# Patient Record
Sex: FEMALE | Race: OTHER | URBAN - METROPOLITAN AREA
[De-identification: names, ages, dates, MRNs, and addresses within clinical notes are randomized per-mention and may not be internally consistent; named-entity substitution may affect disease eponyms.]

---

## 2024-08-31 ENCOUNTER — HOSPITAL ENCOUNTER (EMERGENCY)
Facility: HOSPITAL | Age: 44
Discharge: HOME | End: 2024-08-31
Attending: EMERGENCY MEDICINE
Payer: MEDICAID

## 2024-08-31 VITALS
OXYGEN SATURATION: 98 % | HEART RATE: 78 BPM | RESPIRATION RATE: 16 BRPM | SYSTOLIC BLOOD PRESSURE: 126 MMHG | DIASTOLIC BLOOD PRESSURE: 58 MMHG | TEMPERATURE: 98 F

## 2024-08-31 DIAGNOSIS — R42 DIZZINESS: ICD-10-CM

## 2024-08-31 DIAGNOSIS — R11.0 NAUSEA: ICD-10-CM

## 2024-08-31 DIAGNOSIS — F12.90 USE OF CANNABINOID EDIBLES: Primary | ICD-10-CM

## 2024-08-31 LAB — GLUCOSE SERPL-MCNC: 121 MG/DL (ref 65–140)

## 2024-08-31 PROCEDURE — 99284 EMERGENCY DEPT VISIT MOD MDM: CPT

## 2024-08-31 PROCEDURE — 99284 EMERGENCY DEPT VISIT MOD MDM: CPT | Performed by: EMERGENCY MEDICINE

## 2024-08-31 PROCEDURE — 93005 ELECTROCARDIOGRAM TRACING: CPT

## 2024-08-31 PROCEDURE — 82948 REAGENT STRIP/BLOOD GLUCOSE: CPT

## 2024-08-31 RX ORDER — ONDANSETRON 4 MG/1
4 TABLET, ORALLY DISINTEGRATING ORAL ONCE
Status: COMPLETED | OUTPATIENT
Start: 2024-08-31 | End: 2024-08-31

## 2024-08-31 RX ORDER — ONDANSETRON 4 MG/1
4 TABLET, ORALLY DISINTEGRATING ORAL EVERY 6 HOURS PRN
Qty: 20 TABLET | Refills: 0 | Status: SHIPPED | OUTPATIENT
Start: 2024-08-31

## 2024-08-31 RX ADMIN — ONDANSETRON 4 MG: 4 TABLET, ORALLY DISINTEGRATING ORAL at 22:10

## 2024-09-01 LAB
ATRIAL RATE: 101 BPM
P AXIS: 71 DEGREES
PR INTERVAL: 118 MS
QRS AXIS: 74 DEGREES
QRSD INTERVAL: 78 MS
QT INTERVAL: 356 MS
QTC INTERVAL: 461 MS
T WAVE AXIS: 40 DEGREES
VENTRICULAR RATE: 101 BPM

## 2024-09-01 PROCEDURE — 93010 ELECTROCARDIOGRAM REPORT: CPT | Performed by: STUDENT IN AN ORGANIZED HEALTH CARE EDUCATION/TRAINING PROGRAM

## 2024-09-01 NOTE — DISCHARGE INSTRUCTIONS
Avoid use of Gummies or other edibles.  Drink plenty of fluids, and eat as you are able to tolerate.  Follow-up with your primary care doctor to make sure you are doing better.  Return if you develop worsening or changing symptoms, persistent vomiting and are unable to tolerate fluids despite the nausea medication, or for any other concerns.

## 2024-09-01 NOTE — ED PROVIDER NOTES
History  Chief Complaint   Patient presents with    Dizziness     Per ems, pt took an edible at 6 pm and 7pm.  Pt called ems for dizziness.  On arrival, translation line was used.  PT refused to answer questions, with the exception of her birthdate.  Pt refused to change into gown.  Unable to obtain any information.       Dizziness      None       No past medical history on file.    No past surgical history on file.    No family history on file.  I have reviewed and agree with the history as documented.    No existing history information found.  No existing history information found.       Review of Systems   Neurological:  Positive for dizziness.       Physical Exam  Physical Exam  Vitals and nursing note reviewed.   Constitutional:       General: She is not in acute distress.     Appearance: She is well-developed.   HENT:      Head: Normocephalic and atraumatic.   Eyes:      Extraocular Movements: Extraocular movements intact.      Conjunctiva/sclera: Conjunctivae normal.      Pupils: Pupils are equal, round, and reactive to light.      Comments: Pupils enlarged, but responsive to light   Neck:      Trachea: No tracheal deviation.   Cardiovascular:      Rate and Rhythm: Normal rate and regular rhythm.      Heart sounds: Normal heart sounds.   Pulmonary:      Effort: Pulmonary effort is normal. No respiratory distress.      Breath sounds: Normal breath sounds.   Abdominal:      General: There is no distension.      Palpations: Abdomen is soft.      Tenderness: There is no abdominal tenderness.   Musculoskeletal:      Cervical back: Normal range of motion.   Skin:     General: Skin is warm and dry.   Neurological:      Mental Status: She is alert and oriented to person, place, and time.      GCS: GCS eye subscore is 4. GCS verbal subscore is 5. GCS motor subscore is 6.      Cranial Nerves: No dysarthria or facial asymmetry.      Sensory: Sensation is intact.      Motor: Motor function is intact. No weakness.    Psychiatric:         Mood and Affect: Mood and affect normal.         Behavior: Behavior normal.         Vital Signs  ED Triage Vitals [08/31/24 2035]   Temperature Pulse Respirations Blood Pressure SpO2   98 °F (36.7 °C) 78 16 126/58 98 %      Temp Source Heart Rate Source Patient Position - Orthostatic VS BP Location FiO2 (%)   Oral Monitor Lying Right arm --      Pain Score       --           Vitals:    08/31/24 2035   BP: 126/58   Pulse: 78   Patient Position - Orthostatic VS: Lying         Visual Acuity      ED Medications  Medications   ondansetron (ZOFRAN-ODT) dispersible tablet 4 mg (4 mg Oral Given 8/31/24 2210)       Diagnostic Studies  Results Reviewed       Procedure Component Value Units Date/Time    Fingerstick Glucose (POCT) [[id number]]  (Normal) Collected: 08/31/24 2216    Lab Status: Final result Specimen: Blood Updated: 08/31/24 2217     POC Glucose 121 mg/dl                    No orders to display              Procedures  ECG 12 Lead Documentation Only    Date/Time: 8/31/2024 10:14 PM    Performed by: Hafsa Loza MD  Authorized by: Hafsa Loza MD    Indications / Diagnosis:  Dizziness, palpitations after gummy  ECG reviewed by me, the ED Provider: yes    Patient location:  ED  Previous ECG:     Previous ECG:  Unavailable  Interpretation:     Interpretation: non-specific    Rate:     ECG rate:  101    ECG rate assessment: tachycardic    Rhythm:     Rhythm: sinus tachycardia    Ectopy:     Ectopy: none    QRS:     QRS axis:  Normal    QRS intervals:  Normal  Conduction:     Conduction: normal    ST segments:     ST segments:  Normal  T waves:     T waves: normal             ED Course                                               Medical Decision Making  This is a 43-year-old female who presents here today with dizziness.  She says symptoms began at around 1900 while eating dinner, shortly after eating Gummies.  She feels like things are spinning, and that she  is in a fog.  She denies any weakness or numbness.  She has nausea but no vomiting.  She denies vision changes, recent falls or head injuries.  She has occasional palpitations but denies chest pain.  She denies underlying medical problems.  Last menstrual period was last week, and was normal and regular for her.  She has never had edibles or marijuana previously.  She did have a sip of rum but denies any other alcohol use.  She denies any other drug use.  She did exam club soda which helped slightly.  Symptoms have improved since onset but are still present.    ROS: Otherwise negative, unless stated as above.    She is well-appearing, in no acute distress.  Exam is unremarkable.  Symptoms are likely due to edible use.  With palpitations we will check EKG to ensure there are no dysrhythmias.  We will treat nausea, and ensure she is able to tolerate oral intake prior to discharge.  I am not concerned about potential neurologic process, electrolyte abnormality contributing to symptoms, I do not feel that she needs any further workup or evaluation at this point in time.    EKG was unremarkable, and blood sugar was normal.  She endorsed improvement with Zofran and was able to tolerate fluids without worsening of nausea or vomiting.  I discussed with patient and significant other continued management at home, follow-up as needed, indications for return, they expressed understanding with this plan.    Problems Addressed:  Dizziness: acute illness or injury  Nausea: acute illness or injury  Use of cannabinoid edibles: acute illness or injury    Amount and/or Complexity of Data Reviewed  Labs: ordered. Decision-making details documented in ED Course.  ECG/medicine tests: ordered and independent interpretation performed. Decision-making details documented in ED Course.    Risk  Prescription drug management.                 Disposition  Final diagnoses:   Use of cannabinoid edibles   Dizziness   Nausea     Time reflects when  diagnosis was documented in both MDM as applicable and the Disposition within this note       Time User Action Codes Description Comment    8/31/2024 10:29 PM Hafsa Loza [F12.90] Use of cannabinoid edibles     8/31/2024 10:29 PM Hafsa Loza [R42] Dizziness     8/31/2024 10:29 PM Hafsa Loza [R11.0] Nausea           ED Disposition       ED Disposition   Discharge    Condition   Good    Date/Time   Sat Aug 31, 2024 10:29 PM    Comment   Jeanne Christensen discharge to home/self care.             Follow-up Information       Follow up With Specialties Details Why Contact Info    your primary care doctor  Schedule an appointment as soon as possible for a visit  As needed, to follow up             Patient's Medications   Discharge Prescriptions    ONDANSETRON (ZOFRAN-ODT) 4 MG DISINTEGRATING TABLET    Take 1 tablet (4 mg total) by mouth every 6 (six) hours as needed for nausea or vomiting       Start Date: 8/31/2024 End Date: --       Order Dose: 4 mg       Quantity: 20 tablet    Refills: 0       No discharge procedures on file.    PDMP Review       None            ED Provider  Electronically Signed by             Hafsa Loza MD  09/01/24 0805